# Patient Record
Sex: FEMALE | Race: WHITE | NOT HISPANIC OR LATINO | Employment: OTHER | URBAN - METROPOLITAN AREA
[De-identification: names, ages, dates, MRNs, and addresses within clinical notes are randomized per-mention and may not be internally consistent; named-entity substitution may affect disease eponyms.]

---

## 2021-01-26 ENCOUNTER — OFFICE VISIT (OUTPATIENT)
Dept: OBGYN CLINIC | Facility: CLINIC | Age: 56
End: 2021-01-26
Payer: COMMERCIAL

## 2021-01-26 VITALS
SYSTOLIC BLOOD PRESSURE: 116 MMHG | DIASTOLIC BLOOD PRESSURE: 78 MMHG | BODY MASS INDEX: 23.56 KG/M2 | HEIGHT: 64 IN | WEIGHT: 138 LBS

## 2021-01-26 DIAGNOSIS — N89.8 VAGINAL DISCHARGE: ICD-10-CM

## 2021-01-26 DIAGNOSIS — Z12.31 ENCOUNTER FOR SCREENING MAMMOGRAM FOR BREAST CANCER: ICD-10-CM

## 2021-01-26 DIAGNOSIS — Z01.419 ENCOUNTER FOR ANNUAL ROUTINE GYNECOLOGICAL EXAMINATION: ICD-10-CM

## 2021-01-26 DIAGNOSIS — R35.0 URINE FREQUENCY: Primary | ICD-10-CM

## 2021-01-26 PROCEDURE — 87210 SMEAR WET MOUNT SALINE/INK: CPT | Performed by: OBSTETRICS & GYNECOLOGY

## 2021-01-26 PROCEDURE — 99396 PREV VISIT EST AGE 40-64: CPT | Performed by: OBSTETRICS & GYNECOLOGY

## 2021-01-26 RX ORDER — GABAPENTIN 100 MG/1
CAPSULE ORAL EVERY 12 HOURS
COMMUNITY

## 2021-01-26 RX ORDER — HYDROXYCHLOROQUINE SULFATE 200 MG/1
TABLET, FILM COATED ORAL
COMMUNITY

## 2021-01-26 RX ORDER — HYDROCHLOROTHIAZIDE 12.5 MG/1
CAPSULE, GELATIN COATED ORAL
COMMUNITY
End: 2022-03-08 | Stop reason: ALTCHOICE

## 2021-01-26 NOTE — PROGRESS NOTES
Assessment/Plan:    pap and HPV done today    mammogram reviewed with her including breast density  3D mammogram ordered  Discussed self breast exams    colon cancer screening - colonoscopy up to date    Wet mount is normal     Urinary frequency - UA sent    discussed preventive care, regular exercise and a healthy diet      No problem-specific Assessment & Plan notes found for this encounter  Diagnoses and all orders for this visit:    Urine frequency  -     UA (URINE) with reflex to Scope  -     Pap IG, CtNg,rfxHPV ASCU,16/18    Encounter for annual routine gynecological examination    Encounter for screening mammogram for breast cancer  -     Mammo screening bilateral w 3d & cad; Future    Vaginal discharge  -     POCT wet mount    Other orders  -     gabapentin (NEURONTIN) 100 mg capsule; Every 12 hours  -     hydroxychloroquine (Plaquenil) 200 mg tablet; 2 tabs daily  -     hydrochlorothiazide (MICROZIDE) 12 5 mg capsule; Take 2 capsules every day          Subjective:      Patient ID: Kerri Barroso is a 61 y o  female  New Pt - 61year old female  She has no gyn complaints  Her last exam was about 2 years ago  No history of abnormal Paps  Status post vaginal delivery x1  She has noticed some vaginal discharge, no odor  She also has urinary frequency  She does have a history of Sjogren's disease and sees Rheumatology regularly  She has a history of interstitial cystitis that was treated but now it seems to be in remission as she has no symptoms  H/o benign breast bx  The following portions of the patient's history were reviewed and updated as appropriate: allergies, current medications, past family history, past medical history, past social history, past surgical history and problem list     Review of Systems   Constitutional: Negative  HENT: Negative  Eyes: Negative  Respiratory: Negative  Cardiovascular: Negative  Gastrointestinal: Negative      Endocrine: Negative  Genitourinary: Negative  Musculoskeletal: Negative  Skin: Negative  Allergic/Immunologic: Negative  Neurological: Negative  Hematological: Negative  Psychiatric/Behavioral: Negative  Objective:      /78 (BP Location: Left arm, Patient Position: Sitting, Cuff Size: Adult)   Ht 5' 4" (1 626 m)   Wt 62 6 kg (138 lb)   BMI 23 69 kg/m²          Physical Exam  Vitals signs reviewed  Constitutional:       Appearance: She is well-developed  Neck:      Thyroid: No thyromegaly  Trachea: No tracheal deviation  Cardiovascular:      Rate and Rhythm: Normal rate and regular rhythm  Pulmonary:      Effort: Pulmonary effort is normal       Breath sounds: Normal breath sounds  Chest:      Breasts: Breasts are symmetrical          Right: No inverted nipple, mass, nipple discharge, skin change or tenderness  Left: No inverted nipple, mass, nipple discharge, skin change or tenderness  Abdominal:      General: There is no distension  Palpations: Abdomen is soft  There is no mass  Tenderness: There is no abdominal tenderness  Genitourinary:     Labia:         Right: No rash, tenderness, lesion or injury  Left: No rash, tenderness, lesion or injury  Vagina: Normal       Cervix: No cervical motion tenderness, discharge or friability  Adnexa:         Right: No mass, tenderness or fullness  Left: No mass, tenderness or fullness          Rectum: Normal

## 2021-01-27 LAB
APPEARANCE UR: CLEAR
BACTERIA URNS QL MICRO: ABNORMAL
BILIRUB UR QL STRIP: NEGATIVE
COLOR UR: YELLOW
EPI CELLS #/AREA URNS HPF: ABNORMAL /HPF (ref 0–10)
GLUCOSE UR QL: NEGATIVE
HGB UR QL STRIP: NEGATIVE
KETONES UR QL STRIP: NEGATIVE
LEUKOCYTE ESTERASE UR QL STRIP: ABNORMAL
MICRO URNS: ABNORMAL
MUCOUS THREADS URNS QL MICRO: PRESENT
NITRITE UR QL STRIP: NEGATIVE
PH UR STRIP: 6.5 [PH] (ref 5–7.5)
PROT UR QL STRIP: NEGATIVE
RBC #/AREA URNS HPF: ABNORMAL /HPF (ref 0–2)
SP GR UR: 1.01 (ref 1–1.03)
UROBILINOGEN UR STRIP-ACNC: 0.2 MG/DL (ref 0.2–1)
WBC #/AREA URNS HPF: ABNORMAL /HPF (ref 0–5)

## 2021-02-12 LAB
C TRACH RRNA SPEC QL NAA+PROBE: NEGATIVE
CYTOLOGIST CVX/VAG CYTO: NORMAL
DX ICD CODE: NORMAL
Lab: NORMAL
N GONORRHOEA RRNA SPEC QL NAA+PROBE: NEGATIVE
OTHER STN SPEC: NORMAL
OTHER STN SPEC: NORMAL
PATH REPORT.FINAL DX SPEC: NORMAL
SL AMB NOTE:: NORMAL
SL AMB SPECIMEN ADEQUACY: NORMAL
SL AMB TEST METHODOLOGY: NORMAL

## 2021-07-06 ENCOUNTER — OFFICE VISIT (OUTPATIENT)
Dept: OBGYN CLINIC | Facility: CLINIC | Age: 56
End: 2021-07-06
Payer: COMMERCIAL

## 2021-07-06 VITALS
HEIGHT: 64 IN | WEIGHT: 136 LBS | DIASTOLIC BLOOD PRESSURE: 68 MMHG | BODY MASS INDEX: 23.22 KG/M2 | SYSTOLIC BLOOD PRESSURE: 108 MMHG

## 2021-07-06 DIAGNOSIS — R53.83 OTHER FATIGUE: ICD-10-CM

## 2021-07-06 DIAGNOSIS — N95.0 POST-MENOPAUSAL BLEEDING: Primary | ICD-10-CM

## 2021-07-06 PROCEDURE — 58100 BIOPSY OF UTERUS LINING: CPT | Performed by: OBSTETRICS & GYNECOLOGY

## 2021-07-06 PROCEDURE — 99213 OFFICE O/P EST LOW 20 MIN: CPT | Performed by: OBSTETRICS & GYNECOLOGY

## 2021-07-06 NOTE — PROGRESS NOTES
Assessment/Plan:     Post menopausal bleeding   -We reviewed this in detail and explained the need to evaluate her endometrium and rule out hyperplasia  We discussed doing an ultrasound and also an endometrial biopsy  She would like to proceed both  Endometrial biopsy done without difficulty, see separate note  Ultrasound ordered and she will have this done  We did discuss other causes of postmenopausal bleeding such as atrophy  Also, she has been using tampons since she started bleeding and I cautioned her against this as the dryness caused by menopause would make the tampon extremely uncomfortable and she had some mild irritation of the vaginal walls that is most likely from the tampons  Fatigue- TSH and a CBC ordered  There are no diagnoses linked to this encounter  Subjective:     Patient ID: Alvin Hadley is a 61 y o  female  Patient presents for evaluation of vaginal bleeding  She had some minimal brown discharge that started over a month ago and was intermittent  She was also diagnosed with tinea versicolor and was given Lamisil cream that she has used externally  This past Friday, she noticed blood in her underwear and felt that the bleeding was heavier  She started using a tampon and this was very painful  She went through menopause in September 2016  She is not sexually active  Also, she has been feeling more fatigued lately  Review of Systems   Constitutional: Negative  Genitourinary: Positive for vaginal bleeding  Objective:     Physical Exam  Genitourinary:     General: Normal vulva        Vagina: Normal       Cervix: Normal       Uterus: Normal        Adnexa: Right adnexa normal and left adnexa normal

## 2021-07-06 NOTE — PROGRESS NOTES
Endometrial biopsy    Date/Time: 7/6/2021 12:51 PM  Performed by: Cyn Kaye DO  Authorized by: Cyn Kaye DO   Universal Protocol:  Consent: Verbal consent obtained  Written consent obtained  Consent given by: patient  Patient understanding: patient states understanding of the procedure being performed  Patient identity confirmed: verbally with patient      Indication:     Indications: Post-menopausal bleeding      Progression of post-menopausal bleeding:  Partially resolved  Procedure:     Procedure: endometrial biopsy with Pipelle      A bivalve speculum was placed in the vagina: yes      Cervix cleaned and prepped: yes      The cervix was dilated: no      Uterus sounded: yes      Uterus sound depth (cm):  6    Patient tolerated procedure well with no complications: yes    Findings:     Uterus size:  Non-gravid    Cervix: normal      Adnexa: normal    Comments:     Procedure comments:  EMB done without difficulty  Will await results  US ordered  See progress note

## 2021-07-07 LAB
BASOPHILS # BLD AUTO: 0 X10E3/UL (ref 0–0.2)
BASOPHILS NFR BLD AUTO: 0 %
EOSINOPHIL # BLD AUTO: 0.1 X10E3/UL (ref 0–0.4)
EOSINOPHIL NFR BLD AUTO: 2 %
ERYTHROCYTE [DISTWIDTH] IN BLOOD BY AUTOMATED COUNT: 11.9 % (ref 11.7–15.4)
HCT VFR BLD AUTO: 38.6 % (ref 34–46.6)
HGB BLD-MCNC: 13.1 G/DL (ref 11.1–15.9)
IMM GRANULOCYTES # BLD: 0 X10E3/UL (ref 0–0.1)
IMM GRANULOCYTES NFR BLD: 0 %
LYMPHOCYTES # BLD AUTO: 1.7 X10E3/UL (ref 0.7–3.1)
LYMPHOCYTES NFR BLD AUTO: 34 %
MCH RBC QN AUTO: 31.4 PG (ref 26.6–33)
MCHC RBC AUTO-ENTMCNC: 33.9 G/DL (ref 31.5–35.7)
MCV RBC AUTO: 93 FL (ref 79–97)
MONOCYTES # BLD AUTO: 0.3 X10E3/UL (ref 0.1–0.9)
MONOCYTES NFR BLD AUTO: 6 %
NEUTROPHILS # BLD AUTO: 2.9 X10E3/UL (ref 1.4–7)
NEUTROPHILS NFR BLD AUTO: 58 %
PLATELET # BLD AUTO: 206 X10E3/UL (ref 150–450)
RBC # BLD AUTO: 4.17 X10E6/UL (ref 3.77–5.28)
TSH SERPL DL<=0.005 MIU/L-ACNC: 1.03 UIU/ML (ref 0.45–4.5)
WBC # BLD AUTO: 5.1 X10E3/UL (ref 3.4–10.8)

## 2021-07-13 LAB
PATH REPORT.SITE OF ORIGIN SPEC: NORMAL
PAYMENT PROCEDURE: NORMAL
SL AMB .: NORMAL

## 2022-03-07 NOTE — PROGRESS NOTES
Assessment/Plan:    pap and HPV done today    mammogram reviewed with her including breast density  RX given and she has this scheduled in a few weeks  Discussed self breast exams    colon cancer screening - colonoscopy done at age 47 and recall in 8 years    Vulvar itching-no sign of yeast or infection  She thinks it may be related to her tinea versicolor  She is seeing a dermatologist for this  discussed preventive care, regular exercise and a healthy diet      No problem-specific Assessment & Plan notes found for this encounter  Diagnoses and all orders for this visit:    Encounter for annual routine gynecological examination  -     IGP, Aptima HPV, Rfx 16/18,45    Encounter for screening mammogram for breast cancer  -     Mammo screening bilateral w 3d & cad; Future          Subjective:      Patient ID: Toño Mena is a 61 y o  female  Patient here for yearly  Last summer, she had an episode of postmenopausal bleeding, endometrial biopsy was normal  No further bleeding  She is having vaginal dryness  Some vulvar itching  Only at night, not severe, no discharge or odor  Normal Pap in 2021  Normal breast ultrasound/ABUS in March 2021      The following portions of the patient's history were reviewed and updated as appropriate: allergies, current medications, past family history, past medical history, past social history, past surgical history and problem list     Review of Systems   Constitutional: Negative  Gastrointestinal: Negative  Genitourinary: Negative  Objective: There were no vitals taken for this visit  Physical Exam  Vitals reviewed  Constitutional:       Appearance: She is well-developed  Neck:      Thyroid: No thyromegaly  Trachea: No tracheal deviation  Cardiovascular:      Rate and Rhythm: Normal rate and regular rhythm  Pulmonary:      Effort: Pulmonary effort is normal       Breath sounds: Normal breath sounds     Chest: Breasts: Breasts are symmetrical       Right: No inverted nipple, mass, nipple discharge, skin change or tenderness  Left: No inverted nipple, mass, nipple discharge, skin change or tenderness  Abdominal:      General: There is no distension  Palpations: Abdomen is soft  There is no mass  Tenderness: There is no abdominal tenderness  Genitourinary:     Labia:         Right: No rash, tenderness, lesion or injury  Left: No rash, tenderness, lesion or injury  Vagina: Normal       Cervix: No cervical motion tenderness, discharge or friability  Adnexa:         Right: No mass, tenderness or fullness  Left: No mass, tenderness or fullness          Rectum: Normal

## 2022-03-08 ENCOUNTER — ANNUAL EXAM (OUTPATIENT)
Dept: OBGYN CLINIC | Facility: CLINIC | Age: 57
End: 2022-03-08
Payer: COMMERCIAL

## 2022-03-08 VITALS
WEIGHT: 139.2 LBS | BODY MASS INDEX: 23.76 KG/M2 | HEIGHT: 64 IN | DIASTOLIC BLOOD PRESSURE: 62 MMHG | SYSTOLIC BLOOD PRESSURE: 90 MMHG

## 2022-03-08 DIAGNOSIS — Z01.419 ENCOUNTER FOR ANNUAL ROUTINE GYNECOLOGICAL EXAMINATION: Primary | ICD-10-CM

## 2022-03-08 DIAGNOSIS — Z12.31 ENCOUNTER FOR SCREENING MAMMOGRAM FOR BREAST CANCER: ICD-10-CM

## 2022-03-08 PROCEDURE — 99396 PREV VISIT EST AGE 40-64: CPT | Performed by: OBSTETRICS & GYNECOLOGY

## 2022-03-15 ENCOUNTER — TELEPHONE (OUTPATIENT)
Dept: OBGYN CLINIC | Facility: CLINIC | Age: 57
End: 2022-03-15

## 2022-03-15 LAB
CYTOLOGIST CVX/VAG CYTO: ABNORMAL
DX ICD CODE: ABNORMAL
HPV I/H RISK 4 DNA CVX QL PROBE+SIG AMP: POSITIVE
HPV16 DNA CVX QL PROBE+SIG AMP: NEGATIVE
HPV18+45 E6+E7 MRNA CVX QL NAA+PROBE: NEGATIVE
OTHER STN SPEC: ABNORMAL
PATH REPORT.FINAL DX SPEC: ABNORMAL
SL AMB NOTE:: ABNORMAL
SL AMB SPECIMEN ADEQUACY: ABNORMAL
SL AMB TEST METHODOLOGY: ABNORMAL

## 2022-03-16 ENCOUNTER — TELEPHONE (OUTPATIENT)
Dept: OBGYN CLINIC | Facility: CLINIC | Age: 57
End: 2022-03-16

## 2022-03-16 NOTE — TELEPHONE ENCOUNTER
----- Message from Austin Gunter sent at 3/16/2022  6:53 AM EDT -----  Regarding: Positive HPV A  Thank you! I agree to this - only also per the post menopausal bleeding last year     I know that is likely unrelated, but it will put my mind to rest     Many thanks, Blessing Hays

## 2022-03-18 ENCOUNTER — PROCEDURE VISIT (OUTPATIENT)
Dept: OBGYN CLINIC | Facility: CLINIC | Age: 57
End: 2022-03-18
Payer: COMMERCIAL

## 2022-03-18 VITALS
HEIGHT: 63 IN | SYSTOLIC BLOOD PRESSURE: 126 MMHG | WEIGHT: 140.8 LBS | DIASTOLIC BLOOD PRESSURE: 80 MMHG | BODY MASS INDEX: 24.95 KG/M2

## 2022-03-18 DIAGNOSIS — N84.1 ENDOCERVICAL POLYP: ICD-10-CM

## 2022-03-18 DIAGNOSIS — R87.618 ABNORMAL PAPANICOLAOU SMEAR OF CERVIX WITH POSITIVE HUMAN PAPILLOMA VIRUS (HPV) TEST: Primary | ICD-10-CM

## 2022-03-18 PROCEDURE — 88305 TISSUE EXAM BY PATHOLOGIST: CPT | Performed by: PATHOLOGY

## 2022-03-18 PROCEDURE — 57456 ENDOCERV CURETTAGE W/SCOPE: CPT | Performed by: OBSTETRICS & GYNECOLOGY

## 2022-03-18 NOTE — PROGRESS NOTES
Colposcopy    Date/Time: 3/18/2022 3:06 PM  Performed by: Abbie Mathews DO  Authorized by: Abbie Mathews DO     Consent:     Consent obtained:  Verbal and written    Consent given by:  Patient    Patient questions answered: yes    Indication:     Indications: Normal Pap, positive HPV  Procedure:     Procedure: Colposcopy w/ endocervical curettage      Merritt speculum was placed in the vagina: yes      Under colposcopic examination the transition zone was seen in entirety: yes      Endocervix was curetted using a Kevorkian curette: yes      Monsel's solution was applied: no      Specimen to pathology: yes    Post-procedure:     Patient tolerance of procedure: Tolerated well, no immediate complications  Comments:      Normal pap, + HPV ( negative 16 and 18)     colposcopy done without difficulty  ECC done and will await results  A very small endocervical polyp noted  The os was too narrow to allow an Allis or a packing forceps to grab the polyp  I tried different instruments such as a hemostat and a needle  but was unable to grasp it  It was then not visible  Will schedule a sonohysterogram to rule out an endometrial polyp

## 2022-08-01 NOTE — PROGRESS NOTES
Assessment/Plan:     Endocervical polyp - I reviewed the sonohysterogram with her in detail  The polyp appears to be in the cervix although it may actually originate in the lower uterine segment  Unfortunately, it cannot be removed for the through the cervix  At her last visit, I tried to grasp it but her os was too narrow to permit and instrument and today I can not even visualize the polyp  During the sonohysterogram today, I did a biopsy  If this is sampled, we may get more information regarding this  We discussed removing the polyp and she would like to proceed with this  D&C, hysteroscopy reviewed with pt in detail  Risks reviewed including bleeding, infection, 1% risk of uterine perforation with rare risk of injury to surrounding structures and need for further surgery  Post op course reviewed in detail as well including activity restrictions  Pt's questions were answered  Pt to schedule  Consent reviewed and signed  There are no diagnoses linked to this encounter  Subjective:     Patient ID: Toño Mena is a 61 y o  female  Patient presents for sonohysterogram   She had a small endocervical polyp noted at the time of colposcopy  Her cervical os was too narrow to grasp polyp with an Allis our packing forceps  I recommended a sonohysterogram to rule out an endometrial polyp  She has had no bleeding  She is menopausal   Sonohysterogram today revealed a filling defect in the cervix  This was not visible on physical exam and her endometrium was thin and appeared smooth and normal       Review of Systems   Constitutional: Negative  Gastrointestinal: Negative  Genitourinary: Negative  Negative for vaginal bleeding  Objective:     Physical Exam  Cardiovascular:      Rate and Rhythm: Normal rate and regular rhythm  Pulmonary:      Effort: Pulmonary effort is normal       Breath sounds: Normal breath sounds  Neurological:      Mental Status: She is alert

## 2022-08-02 ENCOUNTER — OFFICE VISIT (OUTPATIENT)
Dept: OBGYN CLINIC | Facility: CLINIC | Age: 57
End: 2022-08-02
Payer: COMMERCIAL

## 2022-08-02 ENCOUNTER — ULTRASOUND (OUTPATIENT)
Dept: OBGYN CLINIC | Facility: CLINIC | Age: 57
End: 2022-08-02
Payer: COMMERCIAL

## 2022-08-02 VITALS
HEIGHT: 63 IN | WEIGHT: 138.6 LBS | DIASTOLIC BLOOD PRESSURE: 76 MMHG | SYSTOLIC BLOOD PRESSURE: 136 MMHG | BODY MASS INDEX: 24.56 KG/M2

## 2022-08-02 DIAGNOSIS — N95.0 PMB (POSTMENOPAUSAL BLEEDING): Primary | ICD-10-CM

## 2022-08-02 DIAGNOSIS — N84.1 ENDOCERVICAL POLYP: Primary | ICD-10-CM

## 2022-08-02 DIAGNOSIS — R30.0 DYSURIA: ICD-10-CM

## 2022-08-02 PROBLEM — M35.00 SJOGREN'S SYNDROME (HCC): Status: ACTIVE | Noted: 2022-08-02

## 2022-08-02 PROCEDURE — 99213 OFFICE O/P EST LOW 20 MIN: CPT | Performed by: OBSTETRICS & GYNECOLOGY

## 2022-08-02 PROCEDURE — 58340 CATHETER FOR HYSTEROGRAPHY: CPT | Performed by: OBSTETRICS & GYNECOLOGY

## 2022-08-02 PROCEDURE — 76831 ECHO EXAM UTERUS: CPT | Performed by: OBSTETRICS & GYNECOLOGY

## 2022-08-02 PROCEDURE — 58100 BIOPSY OF UTERUS LINING: CPT | Performed by: OBSTETRICS & GYNECOLOGY

## 2022-08-02 PROCEDURE — 76830 TRANSVAGINAL US NON-OB: CPT | Performed by: OBSTETRICS & GYNECOLOGY

## 2022-08-02 NOTE — PROGRESS NOTES
AMB US Pelvic Non OB    Date/Time: 8/2/2022 6:51 AM  Performed by: Brian Newsome  Authorized by: Simi Acharya DO   Universal Protocol:  Patient identity confirmed: verbally with patient      Procedure details:     Technique:  Transvaginal US, Non-OB    Position: lithotomy exam    Uterine findings:     Length (cm): 6 41    Height (cm):  3 37    Width (cm):  4 39    Endometrial stripe: identified      Endometrium thickness (mm):  3 3  Left ovary findings:     Left ovary:  Visualized    Length (cm): 2 66    Height (cm): 1 38    Width (cm): 1 26  Right ovary findings:     Right ovary:  Visualized    Length (cm): 2 2    Height (cm): 1 18    Width (cm): 1 39  Other findings:     Free pelvic fluid: not identified      Free peritoneal fluid: not identified    Post-Procedure Details:     Impression:  Anteverted uterus and bilateral ovaries appear within normal limits  No free fluid  Tolerance: Tolerated well, no immediate complications    Complications: no complications    Additional Procedure Comments:      ElationEMR F8 E8C-RS transvaginal transducer Serial # [id number] was used to perform the examination today and subsequently followed with high level disinfection utilizing Trophon EPR procedure  Ultrasound performed at:     [id number] [place] [address]  Phone:  [phone]  Fax:  [phone]    Date/Time: 8/2/2022 6:55 AM  Performed by: Simi Acharya DO  Authorized by:  Simi Acharya DO   Universal Protocol:  Patient identity confirmed: verbally with patient      Pre-procedure:     Prepped with: Hibiclens    Procedure:     Cervix cleaned and prepped: yes      Uterus sounded: yes      Catheter inserted: yes      Uterine cavity distended with saline: yes    Post-procedure:     Patient observed: yes      Post procedure instructions given to patient: yes      Patient tolerated procedure well with no complications: yes    Comments:      Sonohysterogram demonstrates a polyp within the cervix  Endometrial biopsy    Date/Time: 8/2/2022 6:56 AM  Performed by: Nancy Bernal DO  Authorized by:  Nancy Bernal DO   Universal Protocol:  Patient identity confirmed: verbally with patient      Procedure:     Procedure: endometrial biopsy with Pipelle      A bivalve speculum was placed in the vagina: yes      Cervix cleaned and prepped: yes      Specimen collected: specimen collected and sent to pathology      Patient tolerated procedure well with no complications: yes

## 2022-08-03 LAB
APPEARANCE UR: CLEAR
BACTERIA URNS QL MICRO: NORMAL
BILIRUB UR QL STRIP: NEGATIVE
CASTS URNS QL MICRO: NORMAL /LPF
COLOR UR: YELLOW
EPI CELLS #/AREA URNS HPF: NORMAL /HPF (ref 0–10)
GLUCOSE UR QL: NEGATIVE
HGB UR QL STRIP: NEGATIVE
KETONES UR QL STRIP: NEGATIVE
LEUKOCYTE ESTERASE UR QL STRIP: ABNORMAL
MICRO URNS: ABNORMAL
NITRITE UR QL STRIP: NEGATIVE
PH UR STRIP: 6 [PH] (ref 5–7.5)
PROT UR QL STRIP: NEGATIVE
RBC #/AREA URNS HPF: NORMAL /HPF (ref 0–2)
SP GR UR: 1.02 (ref 1–1.03)
UROBILINOGEN UR STRIP-ACNC: 0.2 MG/DL (ref 0.2–1)
WBC #/AREA URNS HPF: NORMAL /HPF (ref 0–5)

## 2022-08-05 ENCOUNTER — TELEPHONE (OUTPATIENT)
Dept: OBGYN CLINIC | Facility: CLINIC | Age: 57
End: 2022-08-05

## 2022-08-05 NOTE — TELEPHONE ENCOUNTER
I spoke to [place] [facility] at [facility] on 8/5/2022 at 10:23 am no prior authorization is needed for CPT code 90489 being performed on 9/6/2022        REF # [id number]

## 2022-08-06 LAB
PATH REPORT.SITE OF ORIGIN SPEC: NORMAL
PAYMENT PROCEDURE: NORMAL
SL AMB .: NORMAL

## 2022-08-31 RX ORDER — POLYETHYLENE GLYCOL 400 AND PROPYLENE GLYCOL 4; 3 MG/ML; MG/ML
SOLUTION/ DROPS OPHTHALMIC
COMMUNITY

## 2022-08-31 NOTE — PRE-PROCEDURE INSTRUCTIONS
Pre-Surgery Instructions:   Medication Instructions    gabapentin (NEURONTIN) 100 mg capsule Take day of surgery   hydroxychloroquine (PLAQUENIL) 200 mg tablet Take day of surgery   polyethylene glycol-propylene glycol (Systane) 0 4-0 3 % Take day of surgery   Restasis 0 05 % ophthalmic emulsion Take day of surgery  Pt denies fever, sob, sore throat and cough  Pt instructed to stop nsaids and supplements one week prior to surgery  Pt verbalized understanding of shower and med instructions

## 2022-09-02 ENCOUNTER — ANESTHESIA EVENT (OUTPATIENT)
Dept: PERIOP | Facility: HOSPITAL | Age: 57
End: 2022-09-02
Payer: COMMERCIAL

## 2022-09-02 PROCEDURE — NC001 PR NO CHARGE: Performed by: OBSTETRICS & GYNECOLOGY

## 2022-09-03 NOTE — H&P
H&P Exam - Gynecology   Jayshree Taylor 61 y o  female MRN: [id number]  Unit/Bed#:  Encounter: [id number]    Assessment/Plan     Assessment:  Endometrial/endocervical polyp  Plan:  Plan for D&C, hysteroscopy with removal of polyp  D&C, hysteroscopy reviewed with pt in detail  Risks reviewed including bleeding, infection, 1% risk of uterine perforation with rare risk of injury to surrounding structures and need for further surgery  Post op course reviewed in detail as well including activity restrictions  Pt's questions were answered  Consent reviewed and signed        History of Present Illness   HX and PE limited by:   HPI:  Jayshree Taylor is a 61 y o  female who presents with endocervical polyp  This was seen on exam but I was unable to remove as her cervical os was too small  This was not seen at a follow up visit and SIS done and showed a filling defect in the lower uterine segment  She has had no bleeding  Uterus sounded to 6 cm on endometrial biopsy    Review of Systems   Constitutional: Negative  Gastrointestinal: Negative  Genitourinary: Negative          Historical Information   Past Medical History:   Diagnosis Date    Anxiety     Arthritis     Autoimmune disease ([address] )     Chicken pox     Depression     Fibroid     PONV (postoperative nausea and vomiting)     Sjogren's syndrome (HCC)     Urinary tract infection      Past Surgical History:   Procedure Laterality Date    BLADDER SURGERY      BREAST BIOPSY      ENDOMETRIAL ABLATION      MYOMECTOMY      RETINAL DETACHMENT SURGERY      TONSILLECTOMY       OB/GYN History: P1  Family History   Problem Relation Age of Onset    Asthma Other     Anxiety disorder Other     Breast cancer additional onset Other     Depression Other     Heart disease Other     Hepatitis Other     Hypertension Other     Ovarian cancer Other     Urinary tract infection Other     Ovarian cancer Maternal Grandmother     Cancer Maternal Grandmother      Social History   Social History     Substance and Sexual Activity   Alcohol Use Not Currently    Alcohol/week: 0 0 standard drinks     Social History     Substance and Sexual Activity   Drug Use Never     Social History     Tobacco Use   Smoking Status Former Smoker    Packs/day: 0 50    Years: 20 00    Pack years: 10 00    Types: Cigarettes    Start date: 1979   Willard Crowley Quit date: 2002    Years since quittin 4   Smokeless Tobacco Never Used     E-Cigarette/Vaping    E-Cigarette Use Never User      E-Cigarette/Vaping Substances    Nicotine No     THC No     CBD No     Flavoring No     Other No     Unknown No        Meds/Allergies   all current active meds have been reviewed  Allergies   Allergen Reactions    Oxycodone Hives    Codeine Abdominal Pain and Nausea Only     Other reaction(s): Nausea      Morphine Nausea Only    Hydrocodone Rash and Hives       Objective   Vitals: Height 5' 4" (1 626 m), weight 61 2 kg (135 lb)  No intake or output data in the 24 hours ending 22 2206    Invasive Devices: Invasive Devices  Report      None                   Physical Exam  Constitutional:       Appearance: Normal appearance  Cardiovascular:      Rate and Rhythm: Normal rate and regular rhythm  Pulmonary:      Effort: Pulmonary effort is normal       Breath sounds: Normal breath sounds  Genitourinary:     General: Normal vulva  Vagina: Normal       Cervix: Normal       Uterus: Normal        Adnexa: Right adnexa normal and left adnexa normal    Neurological:      Mental Status: She is alert  Lab Results: I have personally reviewed pertinent reports  Imaging: I have personally reviewed pertinent reports  EKG, Pathology, and Other Studies: I have personally reviewed pertinent reports        Code Status: No Order  Advance Directive and Living Will:      Power of :    POLST:      Counseling / Coordination of Care  Total floor / unit time spent today 10 minutes  Greater than 50% of total time was spent with the patient and / or family counseling and / or coordination of care  A description of the counseling / coordination of care: Bibi Jackson

## 2022-09-06 ENCOUNTER — HOSPITAL ENCOUNTER (OUTPATIENT)
Facility: HOSPITAL | Age: 57
Setting detail: OUTPATIENT SURGERY
Discharge: HOME | End: 2022-09-06
Attending: OBSTETRICS & GYNECOLOGY | Admitting: OBSTETRICS & GYNECOLOGY
Payer: COMMERCIAL

## 2022-09-06 ENCOUNTER — ANESTHESIA (OUTPATIENT)
Dept: PERIOP | Facility: HOSPITAL | Age: 57
End: 2022-09-06
Payer: COMMERCIAL

## 2022-09-06 VITALS
SYSTOLIC BLOOD PRESSURE: 132 MMHG | TEMPERATURE: 98.2 F | OXYGEN SATURATION: 97 % | WEIGHT: 139.11 LBS | HEART RATE: 65 BPM | DIASTOLIC BLOOD PRESSURE: 75 MMHG | HEIGHT: 64 IN | BODY MASS INDEX: 23.75 KG/M2 | RESPIRATION RATE: 18 BRPM

## 2022-09-06 DIAGNOSIS — N84.0 ENDOMETRIAL POLYP: ICD-10-CM

## 2022-09-06 PROBLEM — Z98.890 S/P D&C (STATUS POST DILATION AND CURETTAGE): Status: ACTIVE | Noted: 2022-09-06

## 2022-09-06 PROBLEM — N93.9 ABNORMAL UTERINE BLEEDING (AUB): Status: ACTIVE | Noted: 2022-09-06

## 2022-09-06 PROCEDURE — 88305 TISSUE EXAM BY PATHOLOGIST: CPT | Performed by: PATHOLOGY

## 2022-09-06 PROCEDURE — 58558 HYSTEROSCOPY BIOPSY: CPT | Performed by: OBSTETRICS & GYNECOLOGY

## 2022-09-06 RX ORDER — FENTANYL CITRATE 50 UG/ML
50 INJECTION, SOLUTION INTRAMUSCULAR; INTRAVENOUS
Status: DISCONTINUED | OUTPATIENT
Start: 2022-09-06 | End: 2022-09-06

## 2022-09-06 RX ORDER — ACETAMINOPHEN 325 MG/1
975 TABLET ORAL EVERY 6 HOURS PRN
Status: DISCONTINUED | OUTPATIENT
Start: 2022-09-06 | End: 2022-09-06 | Stop reason: HOSPADM

## 2022-09-06 RX ORDER — ONDANSETRON 2 MG/ML
4 INJECTION INTRAMUSCULAR; INTRAVENOUS EVERY 6 HOURS PRN
Status: DISCONTINUED | OUTPATIENT
Start: 2022-09-06 | End: 2022-09-06 | Stop reason: HOSPADM

## 2022-09-06 RX ORDER — MIDAZOLAM HYDROCHLORIDE 2 MG/2ML
INJECTION, SOLUTION INTRAMUSCULAR; INTRAVENOUS AS NEEDED
Status: DISCONTINUED | OUTPATIENT
Start: 2022-09-06 | End: 2022-09-06

## 2022-09-06 RX ORDER — SODIUM CHLORIDE 9 MG/ML
125 INJECTION, SOLUTION INTRAVENOUS CONTINUOUS
Status: DISCONTINUED | OUTPATIENT
Start: 2022-09-06 | End: 2022-09-06 | Stop reason: HOSPADM

## 2022-09-06 RX ORDER — TRAMADOL HYDROCHLORIDE 50 MG/1
50 TABLET ORAL ONCE
Status: COMPLETED | OUTPATIENT
Start: 2022-09-06 | End: 2022-09-06

## 2022-09-06 RX ORDER — KETOROLAC TROMETHAMINE 30 MG/ML
INJECTION, SOLUTION INTRAMUSCULAR; INTRAVENOUS AS NEEDED
Status: DISCONTINUED | OUTPATIENT
Start: 2022-09-06 | End: 2022-09-06

## 2022-09-06 RX ORDER — ONDANSETRON 2 MG/ML
INJECTION INTRAMUSCULAR; INTRAVENOUS AS NEEDED
Status: DISCONTINUED | OUTPATIENT
Start: 2022-09-06 | End: 2022-09-06

## 2022-09-06 RX ORDER — LIDOCAINE HYDROCHLORIDE 10 MG/ML
INJECTION, SOLUTION EPIDURAL; INFILTRATION; INTRACAUDAL; PERINEURAL AS NEEDED
Status: DISCONTINUED | OUTPATIENT
Start: 2022-09-06 | End: 2022-09-06 | Stop reason: HOSPADM

## 2022-09-06 RX ORDER — PROPOFOL 10 MG/ML
INJECTION, EMULSION INTRAVENOUS CONTINUOUS PRN
Status: DISCONTINUED | OUTPATIENT
Start: 2022-09-06 | End: 2022-09-06

## 2022-09-06 RX ORDER — PROPOFOL 10 MG/ML
INJECTION, EMULSION INTRAVENOUS AS NEEDED
Status: DISCONTINUED | OUTPATIENT
Start: 2022-09-06 | End: 2022-09-06

## 2022-09-06 RX ORDER — ACETAMINOPHEN 325 MG/1
975 TABLET ORAL EVERY 6 HOURS SCHEDULED
Status: DISCONTINUED | OUTPATIENT
Start: 2022-09-06 | End: 2022-09-06 | Stop reason: HOSPADM

## 2022-09-06 RX ORDER — IBUPROFEN 600 MG/1
600 TABLET ORAL EVERY 6 HOURS PRN
Status: DISCONTINUED | OUTPATIENT
Start: 2022-09-06 | End: 2022-09-06 | Stop reason: HOSPADM

## 2022-09-06 RX ADMIN — ACETAMINOPHEN 975 MG: 325 TABLET ORAL at 17:06

## 2022-09-06 RX ADMIN — SODIUM CHLORIDE: 0.9 INJECTION, SOLUTION INTRAVENOUS at 16:14

## 2022-09-06 RX ADMIN — SODIUM CHLORIDE 125 ML/HR: 0.9 INJECTION, SOLUTION INTRAVENOUS at 13:34

## 2022-09-06 RX ADMIN — KETOROLAC TROMETHAMINE 30 MG: 30 INJECTION, SOLUTION INTRAMUSCULAR at 16:47

## 2022-09-06 RX ADMIN — ONDANSETRON 4 MG: 2 INJECTION INTRAMUSCULAR; INTRAVENOUS at 16:15

## 2022-09-06 RX ADMIN — PROPOFOL 40 MG: 10 INJECTION, EMULSION INTRAVENOUS at 16:09

## 2022-09-06 RX ADMIN — MIDAZOLAM 2 MG: 1 INJECTION INTRAMUSCULAR; INTRAVENOUS at 16:04

## 2022-09-06 RX ADMIN — TRAMADOL HYDROCHLORIDE 50 MG: 50 TABLET, COATED ORAL at 17:45

## 2022-09-06 RX ADMIN — PROPOFOL 100 MCG/KG/MIN: 10 INJECTION, EMULSION INTRAVENOUS at 16:05

## 2022-09-06 NOTE — DISCHARGE INSTRUCTIONS
Hysteroscopy   WHAT YOU NEED TO KNOW:   A hysteroscopy is a procedure to find and treat problems in your uterus  A hysteroscopy may be done to find, and possibly treat, the cause of abnormal vaginal bleeding, problems getting pregnant, or miscarriage  It may also be done to insert or remove a device that prevents pregnancy  DISCHARGE INSTRUCTIONS:   Call 911 if:   You have trouble breathing  Seek care immediately if:   You have heavy vaginal bleeding that fills 1 or more sanitary pads in 1 hour  You have severe pain or bloating in your abdomen  You feel lightheaded, weak, and confused  You see blood in your urine  You stop urinating or urinate less than usual     Contact your healthcare provider if:   You have a fever or chills  You have pus or a foul-smelling odor coming from your vagina  You see blood clots on your sanitary pad that are larger than the size of a quarter  You have nausea or are vomiting  Your skin is itchy, swollen, or you have a rash  You have questions or concerns about your condition or care  Medicines: You may need any of the following:  Estrogen  helps heal the lining of your uterus  Antibiotics  help prevent a bacterial infection  Prescription pain medicine  may be given  Ask your healthcare provider how to take this medicine safely  Some prescription pain medicines contain acetaminophen  Do not take other medicines that contain acetaminophen without talking to your healthcare provider  Too much acetaminophen may cause liver damage  Prescription pain medicine may cause constipation  Ask your healthcare provider how to prevent or treat constipation  NSAIDs , such as ibuprofen, help decrease swelling, pain, and fever  NSAIDs can cause stomach bleeding or kidney problems in certain people  If you take blood thinner medicine, always ask your healthcare provider if NSAIDs are safe for you   Always read the medicine label and follow directions  Take your medicine as directed  Contact your healthcare provider if you think your medicine is not helping or if you have side effects  Tell him or her if you are allergic to any medicine  Keep a list of the medicines, vitamins, and herbs you take  Include the amounts, and when and why you take them  Bring the list or the pill bottles to follow-up visits  Carry your medicine list with you in case of an emergency  Self-care:  Do not have sex, use tampons, or douche for 6 weeks  These actions may cause an infection  Ask your healthcare provider if it is okay to take a tub bath or swim  Rest as needed  Do not drive, return to work, or exercise for 24 hours or as directed  You can return to most activities in 1 to 2 days  Follow up with your doctor as directed:  Write down your questions so you remember to ask them during your visits  © Copyright [employer] 2022 Information is for End User's use only and may not be sold, redistributed or otherwise used for commercial purposes  All illustrations and images included in CareNotes® are the copyrighted property of A D A M , Inc  or [facility] [place] Larissa Schwab   The above information is an  only  It is not intended as medical advice for individual conditions or treatments  Talk to your doctor, nurse or pharmacist before following any medical regimen to see if it is safe and effective for you

## 2022-09-06 NOTE — ANESTHESIA PREPROCEDURE EVALUATION
Procedure:  D&C WITH HYSTEROSCOPY (N/A Uterus)    Relevant Problems   ANESTHESIA   (+) PONV (postoperative nausea and vomiting)      NEURO/PSYCH   (+) Anxiety   (+) Depression      Other   (+) Sjogren's syndrome (HCC)        Physical Exam    Airway    Mallampati score: II  TM Distance: >3 FB  Neck ROM: full     Dental   No notable dental hx     Cardiovascular  Rhythm: regular, Rate: normal, Cardiovascular exam normal    Pulmonary  Pulmonary exam normal Breath sounds clear to auscultation,     Other Findings        Anesthesia Plan  ASA Score- 2     Anesthesia Type- IV sedation with anesthesia with ASA Monitors  Additional Monitors:   Airway Plan:     Comment: Strong Hx PONV, especially sensitive to narcotics  She has had recent retinal surgery and has increased intraocular pressure  Would avoid scopolamine  Try TIVA with block placed by surgeon  IV PONV Rx          Plan Factors-    Chart reviewed  Patient summary reviewed  Induction-     Postoperative Plan-     Informed Consent- Anesthetic plan and risks discussed with patient

## 2022-09-06 NOTE — OP NOTE
OPERATIVE REPORT  PATIENT NAME: Casper Crowell    :  1965  MRN: [id number]  Pt Location: [facility] OR ROOM 06    SURGERY DATE: 2022    Surgeon(s) and Role:     * Kush Almaraz DO - Primary     * Faisal Summers MD - Assisting    Preop Diagnosis:  Endometrial polyp [N84 0]    Post-Op Diagnosis Codes:     Endocervical polyp    Procedure(s) (LRB):  D&C WITH HYSTEROSCOPY (N/A)    Specimen(s):  ID Type Source Tests Collected by Time Destination   1 : endometrial curettings Tissue Endometrium TISSUE EXAM Kush Almaraz DO 2022 1625        Estimated Blood Loss:   Minimal    Urine output - 100cc    Hysteroscopic I 1000cc    O 800cc    Anesthesia Type:   IV sedation  Paracervical block 10cc of 1% plain lidocaine    Operative Indications:  Endocervical polyp    Operative Findings:  Small endocervical polyp  Narrow endometrium, c/w prior ablation  Uterus sounded to 5cm  Possible small polyp in the cervix      Complications:   None    Procedure and Technique:    Pt taken to the operating room and identified as Casper Crowell  In the dorsal lithotomy position, she was sterilely prepped and draped  Timeout was performed  Bimanual was performed with the above findings  Her bladder was drained for 100cc of clear urine  Weighted and Kimo speculums were placed in the vagina and the anterior lip of the cervix was grasped with a tenaculum  Paracervical block was administered with 10cc of 1% lidocaine  The uterus was then sounded and the cervix dilated to accomodate the diagnostic hysteroscope  This was inserted into the cervix and the endometrium was extremely narrow and the hysteroscope could not be inserted all the way to the fundus  This is consistent with prior ablation  There was a small amount of tissue in the cervix, possible polyp    The hysteroscope was removed and the cervix and lower uterine segment was currettaged in the usual fashion with a minimal amount of tissue obtained, some of it appeared polypoid  Another look was taken with the hysteroscope and the cervix and lower uterine segment were noted to be adequately sampled  At this time, all instruments were removed from the uterus, cervix and vagina  The cervix was hemostatic from the tenaculum  All sponge and instruments were correct x 2  The patient was awakened and taken to the PACU in good condition     I   I was present for the entire procedure    Patient Disposition:  PACU       SIGNATURE: Jaleesa Viramontes DO  DATE: September 6, 2022  TIME: 4:55 PM

## 2022-09-06 NOTE — INTERVAL H&P NOTE
H&P reviewed  After examining the patient I find no changes in the patients condition since the H&P had been written  Reviewed the procedure with the patient  She has had no bleeding  She had an endometrial ablation many years ago  Will plan on D&C, hysteroscopy and removal of polyp  The polyp appears to be cervical or the lower uterine segment  Her questions      Vitals:    09/06/22 1315   BP: 132/69   Pulse: 69   Resp: 16   Temp: 98 5 °F (36 9 °C)   SpO2: 100%

## 2022-09-07 NOTE — ANESTHESIA POSTPROCEDURE EVALUATION
Post-Op Assessment Note    CV Status:  Stable  Pain Score: 3    Pain management: adequate     Mental Status:  Alert and awake   Hydration Status:  Euvolemic   PONV Controlled:  Controlled   Airway Patency:  Patent   Two or more mitigation strategies used for obstructive sleep apnea   Post Op Vitals Reviewed: Yes      Staff: Anesthesiologist, CRNA         No complications documented      BP      Temp      Pulse     Resp      SpO2      /75   Pulse 65   Temp 98 2 °F (36 8 °C) (Temporal)   Resp 18   Ht 5' 4" (1 626 m)   Wt 63 1 kg (139 lb 1 8 oz)   SpO2 97%   BMI 23 88 kg/m²

## 2022-09-12 PROCEDURE — 88305 TISSUE EXAM BY PATHOLOGIST: CPT | Performed by: PATHOLOGY

## 2023-03-20 NOTE — PROGRESS NOTES
Assessment/Plan:    Pap with + HPV - HPV effect on colposcopy last year  Pap and HPV done today, this was discussed with her     mammogram reviewed with her including breast density  RX given for next month  Discussed ABUS  She is interested in this  It was ordered and she will check on coverage  Discussed self breast exams    colon cancer screening - colonoscopy is up to date, was told 10 years, she is not due yet  discussed preventive care, regular exercise and a healthy diet      No problem-specific Assessment & Plan notes found for this encounter  Diagnoses and all orders for this visit:    Encounter for annual routine gynecological examination    Encounter for screening mammogram for breast cancer  -     Mammo screening bilateral w 3d & cad; Future    Dense breast tissue on mammogram  -     US breast screening bilateral complete (ABUS); Future          Subjective:      Patient ID: Ludin Cramer is a 60 y o  female  Patient here for yearly  She has no complaints  Normal Pap with positive HPV in 2022, colposcopy showed some suggestion of HPV and squamous cells in the ECC specimen  Normal 3D mammogram last April with extremely dense breast tissue  ABUS ordered  She is interested in this  The following portions of the patient's history were reviewed and updated as appropriate: allergies, current medications, past family history, past medical history, past social history, past surgical history and problem list     Review of Systems   Constitutional: Negative  Gastrointestinal: Negative  Genitourinary: Negative  Objective: There were no vitals taken for this visit  Physical Exam  Vitals reviewed  Constitutional:       Appearance: She is well-developed  Neck:      Thyroid: No thyromegaly  Trachea: No tracheal deviation  Cardiovascular:      Rate and Rhythm: Normal rate and regular rhythm     Pulmonary:      Effort: Pulmonary effort is normal  Breath sounds: Normal breath sounds  Chest:   Breasts:     Breasts are symmetrical       Right: No inverted nipple, mass, nipple discharge, skin change or tenderness  Left: No inverted nipple, mass, nipple discharge, skin change or tenderness  Abdominal:      General: There is no distension  Palpations: Abdomen is soft  There is no mass  Tenderness: There is no abdominal tenderness  Genitourinary:     Labia:         Right: No rash, tenderness, lesion or injury  Left: No rash, tenderness, lesion or injury  Vagina: Normal       Cervix: No cervical motion tenderness, discharge or friability  Adnexa:         Right: No mass, tenderness or fullness  Left: No mass, tenderness or fullness          Rectum: Normal

## 2023-03-21 ENCOUNTER — ANNUAL EXAM (OUTPATIENT)
Dept: GYNECOLOGY | Facility: CLINIC | Age: 58
End: 2023-03-21

## 2023-03-21 VITALS
DIASTOLIC BLOOD PRESSURE: 64 MMHG | SYSTOLIC BLOOD PRESSURE: 118 MMHG | BODY MASS INDEX: 23.9 KG/M2 | HEIGHT: 64 IN | WEIGHT: 140 LBS

## 2023-03-21 DIAGNOSIS — Z01.419 ENCOUNTER FOR ANNUAL ROUTINE GYNECOLOGICAL EXAMINATION: Primary | ICD-10-CM

## 2023-03-21 DIAGNOSIS — Z11.51 SCREENING FOR HPV (HUMAN PAPILLOMAVIRUS): ICD-10-CM

## 2023-03-21 DIAGNOSIS — R92.2 DENSE BREAST TISSUE ON MAMMOGRAM: ICD-10-CM

## 2023-03-21 DIAGNOSIS — Z12.31 ENCOUNTER FOR SCREENING MAMMOGRAM FOR BREAST CANCER: ICD-10-CM

## 2023-03-21 PROBLEM — N93.9 ABNORMAL UTERINE BLEEDING (AUB): Status: RESOLVED | Noted: 2022-09-06 | Resolved: 2023-03-21

## 2023-03-25 LAB
CYTOLOGIST CVX/VAG CYTO: NORMAL
DX ICD CODE: NORMAL
HPV GENOTYPE REFLEX: NORMAL
HPV I/H RISK 4 DNA CVX QL PROBE+SIG AMP: NEGATIVE
OTHER STN SPEC: NORMAL
PATH REPORT.FINAL DX SPEC: NORMAL
SL AMB NOTE:: NORMAL
SL AMB SPECIMEN ADEQUACY: NORMAL
SL AMB TEST METHODOLOGY: NORMAL

## 2023-07-31 DIAGNOSIS — Z12.31 ENCOUNTER FOR SCREENING MAMMOGRAM FOR BREAST CANCER: ICD-10-CM

## 2024-03-26 ENCOUNTER — ANNUAL EXAM (OUTPATIENT)
Dept: GYNECOLOGY | Facility: CLINIC | Age: 59
End: 2024-03-26
Payer: COMMERCIAL

## 2024-03-26 VITALS — DIASTOLIC BLOOD PRESSURE: 62 MMHG | WEIGHT: 135 LBS | SYSTOLIC BLOOD PRESSURE: 116 MMHG | BODY MASS INDEX: 23.17 KG/M2

## 2024-03-26 DIAGNOSIS — Z12.31 ENCOUNTER FOR SCREENING MAMMOGRAM FOR BREAST CANCER: ICD-10-CM

## 2024-03-26 DIAGNOSIS — Z11.51 SCREENING FOR HPV (HUMAN PAPILLOMAVIRUS): ICD-10-CM

## 2024-03-26 DIAGNOSIS — N64.4 BREAST PAIN: ICD-10-CM

## 2024-03-26 DIAGNOSIS — Z01.419 ENCOUNTER FOR ANNUAL ROUTINE GYNECOLOGICAL EXAMINATION: Primary | ICD-10-CM

## 2024-03-26 PROCEDURE — S0612 ANNUAL GYNECOLOGICAL EXAMINA: HCPCS | Performed by: OBSTETRICS & GYNECOLOGY

## 2024-03-26 RX ORDER — SULFAMETHOXAZOLE AND TRIMETHOPRIM 800; 160 MG/1; MG/1
TABLET ORAL
COMMUNITY
Start: 2024-02-16

## 2024-03-26 NOTE — PROGRESS NOTES
Assessment/Plan:    Pap and HPV done today    Left breast burning/pain - exam is normal. Dx left mammogram and ultrasound ordered and she will schedule.  mammogram reviewed with her including breast density.  RX given as she will still need a screening mammogram later this year   Discussed self breast exams    colon cancer screening - colonosocopy is up to date    discussed preventive care, regular exercise and a healthy diet      No problem-specific Assessment & Plan notes found for this encounter.       Diagnoses and all orders for this visit:    Encounter for annual routine gynecological examination  -     IGP, Aptima HPV, Rfx 16/18,45    Breast pain  -     Mammo diagnostic left w 3d & cad; Future  -     US breast left limited (diagnostic); Future    Encounter for screening mammogram for breast cancer  -     Mammo screening bilateral w 3d & cad; Future    Screening for HPV (human papillomavirus)  -     IGP, Aptima HPV, Rfx 16/18,45    Other orders  -     sulfamethoxazole-trimethoprim (BACTRIM DS) 800-160 mg per tablet; take 1 tablet by mouth twice a day for 5 days          Subjective:      Patient ID: Celsa Blanc is a 59 y.o. female.    Patient here for yearly.  For 2 months, she has been having  left breast burning pain.  It started anteriorly and now is around the inferior breast and also the axilla.  She has been lifting heavy weight for 6 months,. It is a burning sensation and occurs 75% of the time.  She does not take anything for it.  The right side is without pain.      Normal pap, negative HPV in 3-2023  Normal 3D mammogram in July with dense tissue  She has ABUS scheduled.         The following portions of the patient's history were reviewed and updated as appropriate: allergies, current medications, past family history, past medical history, past social history, past surgical history, and problem list.    Review of Systems   Constitutional: Negative.    Gastrointestinal: Negative.    Genitourinary:  Negative.          Objective:      /62 (BP Location: Left arm, Patient Position: Sitting)   Wt 61.2 kg (135 lb)   BMI 23.17 kg/m²          Physical Exam  Vitals reviewed.   Constitutional:       Appearance: Normal appearance. She is well-developed.   Neck:      Thyroid: No thyromegaly.      Trachea: No tracheal deviation.   Cardiovascular:      Rate and Rhythm: Normal rate and regular rhythm.   Pulmonary:      Effort: Pulmonary effort is normal.      Breath sounds: Normal breath sounds.   Chest:   Breasts:     Breasts are symmetrical.      Right: No inverted nipple, mass, nipple discharge, skin change or tenderness.      Left: No inverted nipple, mass, nipple discharge, skin change or tenderness.   Abdominal:      General: There is no distension.      Palpations: Abdomen is soft. There is no mass.      Tenderness: There is no abdominal tenderness.   Genitourinary:     Labia:         Right: No rash, tenderness, lesion or injury.         Left: No rash, tenderness, lesion or injury.       Vagina: Normal.      Cervix: Normal. No cervical motion tenderness, discharge or friability.      Uterus: Normal.       Adnexa: Right adnexa normal and left adnexa normal.        Right: No mass, tenderness or fullness.          Left: No mass, tenderness or fullness.        Rectum: Normal.   Neurological:      Mental Status: She is alert.

## 2024-07-25 ENCOUNTER — HOSPITAL ENCOUNTER (OUTPATIENT)
Dept: HOSPITAL 99 - HWWDC | Age: 59
End: 2024-07-25
Payer: COMMERCIAL

## 2024-07-25 DIAGNOSIS — Z12.31: Primary | ICD-10-CM

## 2024-08-08 DIAGNOSIS — Z12.39 SCREENING FOR BREAST CANCER USING NON-MAMMOGRAM MODALITY: ICD-10-CM

## 2024-08-08 DIAGNOSIS — R92.30 DENSE BREAST TISSUE ON MAMMOGRAM, UNSPECIFIED TYPE: Primary | ICD-10-CM

## 2024-08-13 ENCOUNTER — TELEPHONE (OUTPATIENT)
Age: 59
End: 2024-08-13

## 2024-08-13 NOTE — TELEPHONE ENCOUNTER
Called patient and reviewed results and recommendation for breast ultrasound. She will call and schedule at [facility] as she knows it will be covered there

## 2024-08-13 NOTE — TELEPHONE ENCOUNTER
----- Message from Raina Acharya DO sent at 8/8/2024  5:15 PM EDT -----  Please call this patient and let her know that I received her mammogram report from [facility].  It was normal but she has extremely dense breast tissue.  She is a good candidate for automated breast ultrasound.  I ordered this for her and she can check on coverage and schedule at her convenience if covered.

## 2024-08-14 DIAGNOSIS — Z12.31 ENCOUNTER FOR SCREENING MAMMOGRAM FOR BREAST CANCER: ICD-10-CM

## 2024-12-16 ENCOUNTER — HOSPITAL ENCOUNTER (OUTPATIENT)
Dept: HOSPITAL 99 - EMG | Age: 59
End: 2024-12-16
Payer: COMMERCIAL

## 2024-12-16 DIAGNOSIS — G62.9: Primary | ICD-10-CM

## 2024-12-16 DIAGNOSIS — R20.0: ICD-10-CM

## 2025-01-09 ENCOUNTER — HOSPITAL ENCOUNTER (EMERGENCY)
Dept: HOSPITAL 99 - EMR | Age: 60
Discharge: HOME | End: 2025-01-09
Payer: COMMERCIAL

## 2025-01-09 VITALS — BODY MASS INDEX: 23.2 KG/M2

## 2025-01-09 VITALS — RESPIRATION RATE: 15 BRPM | DIASTOLIC BLOOD PRESSURE: 79 MMHG | SYSTOLIC BLOOD PRESSURE: 119 MMHG

## 2025-01-09 VITALS — RESPIRATION RATE: 14 BRPM

## 2025-01-09 VITALS — RESPIRATION RATE: 18 BRPM | SYSTOLIC BLOOD PRESSURE: 102 MMHG | DIASTOLIC BLOOD PRESSURE: 79 MMHG

## 2025-01-09 VITALS — RESPIRATION RATE: 12 BRPM

## 2025-01-09 VITALS — SYSTOLIC BLOOD PRESSURE: 114 MMHG | DIASTOLIC BLOOD PRESSURE: 64 MMHG | RESPIRATION RATE: 18 BRPM

## 2025-01-09 DIAGNOSIS — Z79.899: ICD-10-CM

## 2025-01-09 DIAGNOSIS — K62.5: Primary | ICD-10-CM

## 2025-01-09 DIAGNOSIS — M35.00: ICD-10-CM

## 2025-01-09 LAB
ALBUMIN SERPL-MCNC: 4.4 G/DL (ref 3.5–5)
ALP SERPL-CCNC: 65 U/L (ref 38–126)
ALT SERPL-CCNC: 26 U/L (ref 0–35)
APTT PPP: 30.7 SEC (ref 23.4–35)
AST SERPL-CCNC: 35 U/L (ref 14–36)
BUN SERPL-MCNC: 17 MG/DL (ref 7–17)
CALCIUM SERPL-MCNC: 9.1 MG/DL (ref 8.4–10.2)
CHLORIDE SERPL-SCNC: 105 MMOL/L (ref 98–107)
CO2 SERPL-SCNC: 28 MMOL/L (ref 22–30)
EGFR: > 60
ERYTHROCYTE [DISTWIDTH] IN BLOOD BY AUTOMATED COUNT: 11.9 % (ref 11.5–14.5)
ESTIMATED CREATININE CLEARANCE: 59 ML/MIN
GLUCOSE SERPL-MCNC: 83 MG/DL (ref 70–99)
HCT VFR BLD AUTO: 40.4 % (ref 37–47)
HGB BLD-MCNC: 13.9 G/DL (ref 12–16)
INR PPP: 0.92
MCHC RBC AUTO-ENTMCNC: 34.4 G/DL (ref 33–37)
MCV RBC AUTO: 90 FL (ref 81–99)
NRBC BLD AUTO-RTO: 0 %
PLATELET # BLD AUTO: 169 10^3/UL (ref 130–400)
POTASSIUM SERPL-SCNC: 4 MMOL/L (ref 3.5–5.1)
PROT SERPL-MCNC: 6.6 G/DL (ref 6.3–8.2)
PROTHROMBIN TIME: 12.8 SEC (ref 11.4–14.6)
SODIUM SERPL-SCNC: 140 MMOL/L (ref 135–145)

## 2025-01-09 PROCEDURE — 99283 EMERGENCY DEPT VISIT LOW MDM: CPT

## 2025-02-24 ENCOUNTER — HOSPITAL ENCOUNTER (OUTPATIENT)
Dept: RADIOLOGY | Facility: HOSPITAL | Age: 60
Discharge: HOME | End: 2025-02-24
Payer: COMMERCIAL

## 2025-02-24 DIAGNOSIS — R92.30 DENSE BREAST TISSUE ON MAMMOGRAM, UNSPECIFIED TYPE: ICD-10-CM

## 2025-02-24 DIAGNOSIS — Z12.39 SCREENING FOR BREAST CANCER USING NON-MAMMOGRAM MODALITY: ICD-10-CM

## 2025-02-24 PROCEDURE — 76641 ULTRASOUND BREAST COMPLETE: CPT

## 2025-02-26 ENCOUNTER — RESULTS FOLLOW-UP (OUTPATIENT)
Dept: GYNECOLOGY | Facility: CLINIC | Age: 60
End: 2025-02-26

## 2025-02-27 NOTE — TELEPHONE ENCOUNTER
Left voice mail for patient to confirm if she needs order faxed for her and the fax # if available.

## 2025-03-20 ENCOUNTER — HOSPITAL ENCOUNTER (OUTPATIENT)
Dept: ULTRASOUND IMAGING | Facility: CLINIC | Age: 60
Discharge: HOME | End: 2025-03-20
Payer: COMMERCIAL

## 2025-03-20 DIAGNOSIS — R92.8 ABNORMAL MAMMOGRAM: ICD-10-CM

## 2025-03-20 PROCEDURE — 76642 ULTRASOUND BREAST LIMITED: CPT

## 2025-04-23 ENCOUNTER — HOSPITAL ENCOUNTER (OUTPATIENT)
Dept: HOSPITAL 99 - HWRAD | Age: 60
End: 2025-04-23
Payer: COMMERCIAL

## 2025-04-23 DIAGNOSIS — R06.02: Primary | ICD-10-CM

## 2025-06-24 ENCOUNTER — HOSPITAL ENCOUNTER (OUTPATIENT)
Dept: HOSPITAL 99 - HWRAD | Age: 60
End: 2025-06-24
Payer: COMMERCIAL

## 2025-06-24 DIAGNOSIS — R06.02: Primary | ICD-10-CM

## 2025-07-03 ENCOUNTER — ANNUAL EXAM (OUTPATIENT)
Dept: GYNECOLOGY | Facility: CLINIC | Age: 60
End: 2025-07-03
Payer: COMMERCIAL

## 2025-07-03 VITALS
BODY MASS INDEX: 23.56 KG/M2 | DIASTOLIC BLOOD PRESSURE: 70 MMHG | HEIGHT: 64 IN | WEIGHT: 138 LBS | SYSTOLIC BLOOD PRESSURE: 122 MMHG

## 2025-07-03 DIAGNOSIS — Z11.51 SCREENING FOR HPV (HUMAN PAPILLOMAVIRUS): ICD-10-CM

## 2025-07-03 DIAGNOSIS — R92.30 DENSE BREAST TISSUE ON MAMMOGRAM, UNSPECIFIED TYPE: ICD-10-CM

## 2025-07-03 DIAGNOSIS — Z12.31 ENCOUNTER FOR SCREENING MAMMOGRAM FOR BREAST CANCER: ICD-10-CM

## 2025-07-03 DIAGNOSIS — Z01.419 ENCOUNTER FOR ANNUAL ROUTINE GYNECOLOGICAL EXAMINATION: Primary | ICD-10-CM

## 2025-07-03 DIAGNOSIS — N89.8 VAGINAL DRYNESS: ICD-10-CM

## 2025-07-03 DIAGNOSIS — Z12.39 SCREENING FOR BREAST CANCER USING NON-MAMMOGRAM MODALITY: ICD-10-CM

## 2025-07-03 PROCEDURE — S0612 ANNUAL GYNECOLOGICAL EXAMINA: HCPCS | Performed by: OBSTETRICS & GYNECOLOGY

## 2025-07-03 RX ORDER — LIFITEGRAST 50 MG/ML
SOLUTION/ DROPS OPHTHALMIC
COMMUNITY
Start: 2025-05-13

## 2025-07-03 RX ORDER — ESTRADIOL 10 UG/1
1 TABLET, FILM COATED VAGINAL 2 TIMES WEEKLY
Qty: 8 TABLET | Refills: 11 | Status: SHIPPED | OUTPATIENT
Start: 2025-07-03

## 2025-07-03 RX ORDER — CYCLOSPORINE OPHTHALMIC SOLUTION 1 MG/ML
1 SOLUTION/ DROPS OPHTHALMIC
COMMUNITY
Start: 2025-04-08

## 2025-07-03 RX ORDER — PERFLUOROHEXYLOCTANE 1 MG/MG
SOLUTION OPHTHALMIC
COMMUNITY
Start: 2025-06-17

## 2025-07-03 NOTE — PROGRESS NOTES
Name: Celsa Blanc      : 1965      MRN: [id number]  Encounter Provider: Raina Acharya DO  Encounter Date: 7/3/2025   Encounter department: [facility]  :  Assessment & Plan  Encounter for annual routine gynecological examination         Encounter for screening mammogram for breast cancer    Orders:    Mammo screening bilateral w 3d and cad; Future    Vaginal dryness    Orders:    estradiol (VAGIFEM, YUVAFEM) 10 MCG TABS vaginal tablet; Insert 1 tablet (10 mcg total) into the vagina 2 (two) times a week    Dense breast tissue on mammogram, unspecified type    Orders:    US breast screening bilateral complete (ABUS); Future    MRI Fast Breast; Future    Screening for breast cancer using non-mammogram modality    Orders:    US breast screening bilateral complete (ABUS); Future    MRI Fast Breast; Future    Reviewed her Paps.  She is very anxious about not having a Pap today.  Pap and HPV done    Vaginal dryness-this is from menopause but most likely aggravated by her Sjogren's.  We discussed lubricants and moisturizers and also reviewed vaginal estrogen.  I think she would benefit from vaginal estrogen.  She was given a paper prescription for vaginal estradiol tablets.  We reviewed side effects and risk.  She will consider this.    mammogram reviewed with her including breast density.  RX given and she will schedule.  We discussed additional screening with either ABUS or fast breast MRI.  She is unsure which she would like to do but is aware that she only needs 1 thing.  She asked for orders for both ABUS and fast breast MRI and she will make a decision and schedule  Discussed self breast exams    colon cancer screening -colonoscopy done regularly, due this year.  She will schedule    discussed preventive care, regular exercise and a healthy diet      History of Present Illness   HPI  Celsa Blanc is a 60 y.o. female who presents for yearly.  She is having some vaginal  dryness.    She is getting treated for Sjogrens and on plaquenil and having some pulmonary symptoms.  She is being evaluated for this.    Normal pap, negative HPV in 2023 and 2023, she had a normal pap, positive HPV in 2022    She had an ABUS in February and was recall for the right breast.  She can now return to routine screening  Normal 3D mammogram last July with extremely dense breast tissue        Review of Systems   Constitutional: Negative.    Gastrointestinal: Negative.    Genitourinary: Negative.           Objective   There were no vitals taken for this visit.     Physical Exam  Vitals and nursing note reviewed. Exam conducted with a chaperone present.   Constitutional:       Appearance: She is well-developed.   HENT:      Head: Normocephalic and atraumatic.   Neck:      Thyroid: No thyromegaly.     Cardiovascular:      Rate and Rhythm: Normal rate and regular rhythm.   Pulmonary:      Effort: Pulmonary effort is normal.      Breath sounds: Normal breath sounds.   Chest:   Breasts:     Right: Normal.      Left: Normal.      Comments: Examined seated and supine  Abdominal:      Palpations: Abdomen is soft.   Genitourinary:     General: Normal vulva.      Vagina: Normal.      Cervix: Normal.      Uterus: Normal.       Adnexa: Right adnexa normal and left adnexa normal.      Rectum: Normal.      Comments: Mild to moderate atrophy    Musculoskeletal:      Cervical back: Neck supple.     Skin:     General: Skin is warm and dry.     Neurological:      Mental Status: She is alert.     Psychiatric:         Mood and Affect: Mood normal.

## 2025-07-10 ENCOUNTER — HOSPITAL ENCOUNTER (OUTPATIENT)
Dept: HOSPITAL 99 - RSP | Age: 60
End: 2025-07-10
Payer: COMMERCIAL

## 2025-07-10 ENCOUNTER — RESULTS FOLLOW-UP (OUTPATIENT)
Dept: GYNECOLOGY | Facility: CLINIC | Age: 60
End: 2025-07-10

## 2025-07-10 DIAGNOSIS — R06.02: Primary | ICD-10-CM

## 2025-07-10 LAB
CYTOLOGIST CVX/VAG CYTO: ABNORMAL
DX ICD CODE: ABNORMAL
HPV GENOTYPE REFLEX: ABNORMAL
HPV I/H RISK 4 DNA CVX QL PROBE+SIG AMP: POSITIVE
HPV16 DNA CVX QL PROBE+SIG AMP: NEGATIVE
HPV18+45 E6+E7 MRNA CVX QL NAA+PROBE: NEGATIVE
Lab: ABNORMAL
OTHER STN SPEC: ABNORMAL
PATH REPORT.FINAL DX SPEC: ABNORMAL
SL AMB NOTE:: ABNORMAL
SL AMB SPECIMEN ADEQUACY: ABNORMAL
SL AMB TEST METHODOLOGY: ABNORMAL

## 2025-07-10 NOTE — TELEPHONE ENCOUNTER
Reviewed results/recommendations with Celsa who verbalized understanding.  Patient requested after 8/21 due to daughter graduation and upcoming vacation.  Appt scheduled 8/28 with Dr. Acharya in [place].    
The patient is a 71y Male complaining of medical evaluation.

## 2025-07-11 ENCOUNTER — TELEPHONE (OUTPATIENT)
Age: 60
End: 2025-07-11

## 2025-07-11 NOTE — TELEPHONE ENCOUNTER
Patient notified and verbalizes understanding. Pt has colpo scheduled 8/28/25, but requesting earlier appt if possible as her vacation has been cancelled.

## (undated) DEVICE — GLOVE INDICATOR PI UNDERGLOVE SZ 6.5 BLUE

## (undated) DEVICE — UNDER BUTTOCKS DRAPE W/FLUID CONTROL POUCH: Brand: CONVERTORS

## (undated) DEVICE — CYSTO TUBING SINGLE IRRIGATION

## (undated) DEVICE — SCD SEQUENTIAL COMPRESSION COMFORT SLEEVE MEDIUM KNEE LENGTH: Brand: KENDALL SCD

## (undated) DEVICE — IV EXTENSION TUBING 33 IN

## (undated) DEVICE — GLOVE PI ULTRA TOUCH SZ.6.5

## (undated) DEVICE — PREMIUM DRY TRAY LF: Brand: MEDLINE INDUSTRIES, INC.

## (undated) DEVICE — SYRINGE 10ML LL CONTROL TOP

## (undated) DEVICE — PVC URETHRAL CATHETER: Brand: DOVER

## (undated) DEVICE — STRL ALLENTOWN HYSTEROSCOPY PK: Brand: CARDINAL HEALTH

## (undated) DEVICE — NEEDLE SPINAL 22G X 3.5IN  QUINCKE

## (undated) DEVICE — DRAPE EQUIPMENT RF WAND